# Patient Record
Sex: MALE | Race: WHITE | URBAN - METROPOLITAN AREA
[De-identification: names, ages, dates, MRNs, and addresses within clinical notes are randomized per-mention and may not be internally consistent; named-entity substitution may affect disease eponyms.]

---

## 2020-03-12 ENCOUNTER — OFFICE VISIT (OUTPATIENT)
Dept: FAMILY MEDICINE CLINIC | Age: 55
End: 2020-03-12

## 2020-03-12 VITALS
HEART RATE: 81 BPM | DIASTOLIC BLOOD PRESSURE: 82 MMHG | SYSTOLIC BLOOD PRESSURE: 124 MMHG | RESPIRATION RATE: 17 BRPM | HEIGHT: 72 IN | OXYGEN SATURATION: 97 % | WEIGHT: 202 LBS | BODY MASS INDEX: 27.36 KG/M2 | TEMPERATURE: 97 F

## 2020-03-12 DIAGNOSIS — J06.9 VIRAL UPPER RESPIRATORY TRACT INFECTION: Primary | ICD-10-CM

## 2020-03-12 NOTE — PROGRESS NOTES
HPI  Nicolasa Sandoval is a 53 y.o. male being seen today for   Chief Complaint   Patient presents with    Other     Cleared to be returned to work   . 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 Other than that he has no PMHx and he needs a return to work note after recent URI. he states that he had congestion and cough for a few days but improved today and has no more cough. Never had fever. No recent history of travel or sick exposures. Past Medical History:   Diagnosis Date    Cancer [facility]) 2017    Squamous -cells - surgically removed         ROS  Patient states that he is feeling well. Denies complaints of chest pain, shortness of breath, swelling of legs, dizziness or weakness. he denies nausea, vomiting or diarrhea. No current outpatient medications on file. No current facility-administered medications for this visit. PE  Visit Vitals  /82 (BP 1 Location: Left arm, BP Patient Position: Sitting)   Pulse 81   Temp 97 °F (36.1 °C) (Temporal)   Resp 17   Ht 6' (1.829 m)   Wt 202 lb (91.6 kg)   SpO2 97%   BMI 27.40 kg/m²        Alert and oriented with normal mood and affect. he is well developed and well nourished . Lungs are clear without wheezing. Heart rate is regular without murmurs or gallops. There is no lower extremity edema. TM, OP/PP clear. Well healed scar on right posterior neck. No results found for this or any previous visit. Assessment and Plan:        ICD-10-CM ICD-9-CM    1. Viral upper respiratory tract infection J06.9 465.9      Sx care for uri  Return to work note given    Pt declined labs and preventive labs today. He cn return for well check.        Fang Carrasco MD

## 2020-03-12 NOTE — PROGRESS NOTES
Aristeo Ross presents today for   Chief Complaint   Patient presents with    Other     Cleared to be returned to work       Is someone accompanying this pt? YES NO: No     Is the patient using any DME equipment during OV? no    Depression Screening:  3 most recent PHQ Screens 3/12/2020   Little interest or pleasure in doing things Not at all   Feeling down, depressed, irritable, or hopeless Not at all   Total Score PHQ 2 0       Learning Assessment:  Learning Assessment 3/12/2020   PRIMARY LEARNER Patient   PRIMARY LANGUAGE ENGLISH   LEARNER PREFERENCE PRIMARY OTHER (COMMENT)   ANSWERED BY patient   RELATIONSHIP SELF       Abuse Screening:  No flowsheet data found. Fall Risk  No flowsheet data found. Health Maintenance reviewed and discussed and ordered per Provider. Health Maintenance Due   Topic Date Due    Hepatitis C Screening  1965    DTaP/Tdap/Td series (1 - Tdap) 10/24/1985    Lipid Screen  10/24/2004    Shingrix Vaccine Age 50> (1 of 2) 10/24/2014    FOBT Q1Y Age 50-75  10/24/2014   . Coordination of Care:  1. Have you been to the ER, urgent care clinic since your last visit? Hospitalized since your last visit? No    2. Have you seen or consulted any other health care providers outside of the [address] [place] [address] since your last visit?  Include any pap smears or colon screening. n/a